# Patient Record
(demographics unavailable — no encounter records)

---

## 2024-11-07 NOTE — ASSESSMENT
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 We discussed about epiphysiodesis vs precise nail.  - f/u in 6 months for repeat LLD xrays with his shoes on (has external 2 cm shoe lift) or sooner if any concerns - patient may perform all activities as tolerated - parents understand he may require surgical intervention in the future if LLD remains greater than 2cm; discussed both precice nail lengthening of RLE vs epiphysiodesis of LLE with father today  All questions answered. Family and patient verbalize understanding of the plan.   I, Giuliana Colón PA-C have acted as scribe and documented the above for Dr. Lama

## 2024-11-07 NOTE — HISTORY OF PRESENT ILLNESS
[FreeTextEntry1] : David is a 5Y male who presents today with father for follow up of LLD (L>R). Patient was originally referred by pediatrician due to concern for possible hip asymmetry and is now being followed for his limb length discrepancy LLE>RLE. Patient is second born, male and non-breech. No family hx of DDH. No history of trauma or prior infection of either leg. Patient had pelvis xray at a prior visit which did not demonstrate hip dysplasia. Patient is doing well and meeting all milestones with pediatrician per mother. He does not complain of any pain or discomfort and is a very active child. He was previously referred to his pediatrician for review of an abdominal u/s which was negative for Wilms' tumor. He has been utilizing shoe lift on the right side without any issues. He presents today for repeat leg length XRs and clinical reassessment.   The patient's HPI was reviewed thoroughly with patient and parent. The patient's parent has acted as an independent historian regarding the above information due to the unreliable nature of the history obtained from the patient.

## 2024-11-07 NOTE — END OF VISIT
[FreeTextEntry3] :     Saw and examined patient; the above is an accurate documentation of my words and actions.   Rae Lama MD [facility] Pediatric Orthopedic Surgery    [Time Spent: ___ minutes] : I have spent [unfilled] minutes of time on the encounter which excludes teaching and separately reported services.

## 2024-11-07 NOTE — PHYSICAL EXAM
[FreeTextEntry1] : General: Healthy appearing child, pleasant. Normal non-antalgic gait. Psych: The patient is awake, alert and in no acute distress.  HEENT: Normal appearing eyes, lips, ears, nose. The head is normocephalic, atraumatic. Integumentary: Skin in warm, pink, well perfused Chest: Good respiratory effort with no audible wheezing without use of a stethoscope. Gait: Ambulates independently into the room with no evidence of antalgia. Patient is able to get on and off examination table without difficulty. Neurology: Good coordination and balance. Musculoskeletal: Full range of motion of the cervical spine with no pain. The child is moving all limbs spontaneously. SILT grossly. There are no palpable masses, warmth, or tenderness in bilateral upper and lower extremities. Examination of bilateral lower extremities reveals wide symmetric abduction of bilateral hips to greater than 60. Internal rotation to 45, external rotation to 90. No pain or clicking with with log roll or hip manipulation. L leg is longer than R leg ~3 cm. Positive galeazzi (L>R). Symmetric thigh creases.

## 2024-11-07 NOTE — DATA REVIEWED
[de-identified] : XR limb length performed and reviewed today: normal bony alignment. No e/o hip dysplasia. LLE> RLE by 3 cm. No significant progression noted. Left femur > right femur   Abdominal ultrasound 9/14/21: unremarkable exam; no evidence of wilms' tumor.

## 2025-05-05 NOTE — ASSESSMENT
[FreeTextEntry1] : 4yo M presents for follow up of LLD L>R (3 cm) to be managed with L distal femur epiphysiodesis vs precice nail at a later date Today's visit included obtaining history from the parent due to the child's age, the child could not be considered a reliable historian, requiring parent to act as independent historian  - There is no significant progression in his leg length difference based on XRs performed today L>R ~ 3 cm  - At this time, I am recommending continuing with shoe lift on the right leg about 2.25 cm to provide him balance and support - We again discussed surgical intervention in near future if the leg length inequality continues to progress. We discussed L distal femur epiphysiodesis vs precice nail including R/B/A and potential complications  - f/u in 6 months for repeat LLD xrays with his shoes on (has external 2 cm R shoe lift) or sooner if any concerns - patient may perform all activities as tolerated - parents understand he may require surgical intervention in the future if LLD remains greater than 2cm; discussed both precice nail lengthening of RLE vs epiphysiodesis of LLE with father today  All questions answered. Family and patient verbalize understanding of the plan.

## 2025-05-05 NOTE — DATA REVIEWED
[de-identified] : XR limb lengths with sneakers with R lifted sneaker on performed and reviewed today 5/5/25: normal bony alignment. No e/o hip dysplasia. LLE> RLE by 2.8 cm. No significant progression noted since last visit. Left femur (30cm) > right femur (27.5cm). Left tibia measures 24cm and R tibia measures 23.8 cm. Skeletally immature. No bony abnormalities noted.  Abdominal ultrasound 9/14/21: unremarkable exam; no evidence of wilms' tumor.

## 2025-05-05 NOTE — END OF VISIT
[FreeTextEntry3] :     Saw and examined patient; the above is an accurate documentation of my words and actions.   Rae Lama MD [facility] Pediatric Orthopedic Surgery    [Time Spent: ___ minutes] : I have spent [unfilled] minutes of time on the encounter which excludes teaching and separately reported services.

## 2025-05-05 NOTE — HISTORY OF PRESENT ILLNESS
[FreeTextEntry1] : David is a 5Y male who presents today with both parents for follow up of LLD (L>R). Patient was originally referred by pediatrician due to concern for possible hip asymmetry and is now being followed for his limb length discrepancy LLE>RLE. Patient is second born, male and non-breech. No family hx of DDH. No history of trauma or prior infection of either leg. Patient had pelvis xray at a prior visit which did not demonstrate hip dysplasia. Patient is doing well and meeting all milestones with pediatrician per mother. He does not complain of any pain or discomfort and is a very active child. He was previously referred to his pediatrician for review of an abdominal u/s which was negative for Wilms' tumor. He has been utilizing shoe lift on the right side without any issues. He presents today for repeat leg length XRs and clinical reassessment.   The patient's HPI was reviewed thoroughly with patient and parent. The patient's parent has acted as an independent historian regarding the above information due to the unreliable nature of the history obtained from the patient.